# Patient Record
Sex: MALE | Race: WHITE | Employment: FULL TIME | URBAN - METROPOLITAN AREA
[De-identification: names, ages, dates, MRNs, and addresses within clinical notes are randomized per-mention and may not be internally consistent; named-entity substitution may affect disease eponyms.]

---

## 2018-08-07 ENCOUNTER — TELEPHONE (OUTPATIENT)
Dept: INTERNAL MEDICINE CLINIC | Age: 28
End: 2018-08-07

## 2018-08-07 NOTE — TELEPHONE ENCOUNTER
Received a call from the [facility] that Pt was upset that his appt had been cancelled today (Provider had an ER). Pt was rescheduled, however wife called and yelled at the [facility]. I called and left a message for the Pt to call me back. Pt called back. I explained that the Provider had an ER. I offered to get the Pt rescheduled timely - he declined saying that he would find another office. I offered to have him schedule at [facility] - he declined.